# Patient Record
Sex: MALE | Race: OTHER | URBAN - METROPOLITAN AREA
[De-identification: names, ages, dates, MRNs, and addresses within clinical notes are randomized per-mention and may not be internally consistent; named-entity substitution may affect disease eponyms.]

---

## 2024-02-14 ENCOUNTER — EMERGENCY (EMERGENCY)
Facility: HOSPITAL | Age: 44
LOS: 0 days | Discharge: HOME | End: 2024-02-15
Attending: STUDENT IN AN ORGANIZED HEALTH CARE EDUCATION/TRAINING PROGRAM
Payer: COMMERCIAL

## 2024-02-14 VITALS
HEART RATE: 89 BPM | HEIGHT: 70 IN | DIASTOLIC BLOOD PRESSURE: 94 MMHG | SYSTOLIC BLOOD PRESSURE: 164 MMHG | TEMPERATURE: 99 F | RESPIRATION RATE: 18 BRPM | OXYGEN SATURATION: 97 % | WEIGHT: 210.1 LBS

## 2024-02-14 DIAGNOSIS — M54.2 CERVICALGIA: ICD-10-CM

## 2024-02-14 PROCEDURE — 99284 EMERGENCY DEPT VISIT MOD MDM: CPT

## 2024-02-14 PROCEDURE — 99053 MED SERV 10PM-8AM 24 HR FAC: CPT

## 2024-02-14 NOTE — ED ADULT TRIAGE NOTE - LOCATION:
Right arm; Alternatives Discussed Intro (Do Not Add Period): I discussed alternative treatments to Mohs surgery and specifically discussed the risks and benefits of
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
## 2024-02-15 VITALS
DIASTOLIC BLOOD PRESSURE: 99 MMHG | TEMPERATURE: 99 F | RESPIRATION RATE: 18 BRPM | HEART RATE: 86 BPM | OXYGEN SATURATION: 98 % | SYSTOLIC BLOOD PRESSURE: 143 MMHG

## 2024-02-15 RX ORDER — IBUPROFEN 200 MG
600 TABLET ORAL ONCE
Refills: 0 | Status: COMPLETED | OUTPATIENT
Start: 2024-02-15 | End: 2024-02-15

## 2024-02-15 RX ORDER — METHOCARBAMOL 500 MG/1
1 TABLET, FILM COATED ORAL
Qty: 21 | Refills: 0
Start: 2024-02-15 | End: 2024-02-21

## 2024-02-15 RX ORDER — METHOCARBAMOL 500 MG/1
750 TABLET, FILM COATED ORAL ONCE
Refills: 0 | Status: COMPLETED | OUTPATIENT
Start: 2024-02-15 | End: 2024-02-15

## 2024-02-15 RX ORDER — LIDOCAINE 4 G/100G
1 CREAM TOPICAL ONCE
Refills: 0 | Status: COMPLETED | OUTPATIENT
Start: 2024-02-15 | End: 2024-02-15

## 2024-02-15 RX ORDER — IBUPROFEN 200 MG
1 TABLET ORAL
Qty: 21 | Refills: 0
Start: 2024-02-15 | End: 2024-02-21

## 2024-02-15 RX ADMIN — LIDOCAINE 1 PATCH: 4 CREAM TOPICAL at 01:19

## 2024-02-15 RX ADMIN — Medication 600 MILLIGRAM(S): at 01:19

## 2024-02-15 RX ADMIN — METHOCARBAMOL 750 MILLIGRAM(S): 500 TABLET, FILM COATED ORAL at 01:18
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
## 2024-02-15 NOTE — ED PROVIDER NOTE - PHYSICAL EXAMINATION
General: Well appearing female in no acute distress  HEENT: Normocephalic, atraumatic. Moist mucous membranes. Oropharynx clear. No lymphadenopathy.  Eyes: No scleral icterus. EOMI. EZ.  Neck:. Soft and supple. Full ROM without pain. No midline tenderness. +L paraspinal C spine tenderness.   Cardiac: Regular rate and regular rhythm. No murmurs, rubs, gallops. Peripheral pulses 2+ and symmetric. No LE edema.  Resp: Lungs CTAB. Speaking in full sentences. No wheezes, rales or rhonchi.  Abd: Soft, non-tender, non-distended. No guarding or rebound. No scars, masses, or lesions.  Back: Spine midline and non-tender. No CVA tenderness.    Skin: No rashes, abrasions, or lacerations.  Neuro: AO x 3. Moves all extremities symmetrically. Motor strength and sensation grossly intact.
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
## 2024-02-15 NOTE — ED PROVIDER NOTE - PATIENT PORTAL LINK FT
You can access the FollowMyHealth Patient Portal offered by [facility] by registering at the following website: [email or web address]. By joining [employer]’s FollowMyHealth portal, you will also be able to view your health information using other applications (apps) compatible with our system.
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
## 2025-03-11 ENCOUNTER — INPATIENT (INPATIENT)
Facility: HOSPITAL | Age: 45
LOS: 2 days | Discharge: HOME | End: 2025-03-14
Attending: INTERNAL MEDICINE | Admitting: INTERNAL MEDICINE
Payer: COMMERCIAL

## 2025-03-11 VITALS
HEART RATE: 142 BPM | DIASTOLIC BLOOD PRESSURE: 69 MMHG | OXYGEN SATURATION: 95 % | TEMPERATURE: 100 F | RESPIRATION RATE: 18 BRPM | SYSTOLIC BLOOD PRESSURE: 115 MMHG

## 2025-03-11 DIAGNOSIS — N12 TUBULO-INTERSTITIAL NEPHRITIS, NOT SPECIFIED AS ACUTE OR CHRONIC: ICD-10-CM

## 2025-03-11 DIAGNOSIS — K76.9 LIVER DISEASE, UNSPECIFIED: ICD-10-CM

## 2025-03-11 DIAGNOSIS — R03.0 ELEVATED BLOOD-PRESSURE READING, WITHOUT DIAGNOSIS OF HYPERTENSION: ICD-10-CM

## 2025-03-11 DIAGNOSIS — A41.9 SEPSIS, UNSPECIFIED ORGANISM: ICD-10-CM

## 2025-03-11 LAB
ALBUMIN SERPL ELPH-MCNC: 3.9 G/DL — SIGNIFICANT CHANGE UP (ref 3.3–5)
ALP SERPL-CCNC: 107 U/L — SIGNIFICANT CHANGE UP (ref 40–120)
ALT FLD-CCNC: 36 U/L — SIGNIFICANT CHANGE UP (ref 12–78)
ANION GAP SERPL CALC-SCNC: 5 MMOL/L — SIGNIFICANT CHANGE UP (ref 5–17)
APPEARANCE UR: ABNORMAL
APTT BLD: 30.3 SEC — SIGNIFICANT CHANGE UP (ref 24.5–35.6)
AST SERPL-CCNC: 20 U/L — SIGNIFICANT CHANGE UP (ref 15–37)
BACTERIA # UR AUTO: ABNORMAL /HPF
BASOPHILS # BLD AUTO: 0.03 K/UL — SIGNIFICANT CHANGE UP (ref 0–0.2)
BASOPHILS NFR BLD AUTO: 0.2 % — SIGNIFICANT CHANGE UP (ref 0–2)
BILIRUB SERPL-MCNC: 1 MG/DL — SIGNIFICANT CHANGE UP (ref 0.2–1.2)
BILIRUB UR-MCNC: NEGATIVE — SIGNIFICANT CHANGE UP
BUN SERPL-MCNC: 14 MG/DL — SIGNIFICANT CHANGE UP (ref 7–23)
CALCIUM SERPL-MCNC: 8.9 MG/DL — SIGNIFICANT CHANGE UP (ref 8.5–10.1)
CHLORIDE SERPL-SCNC: 102 MMOL/L — SIGNIFICANT CHANGE UP (ref 96–108)
CO2 SERPL-SCNC: 28 MMOL/L — SIGNIFICANT CHANGE UP (ref 22–31)
COD CRY URNS QL: SIGNIFICANT CHANGE UP
COLOR SPEC: YELLOW — SIGNIFICANT CHANGE UP
CREAT SERPL-MCNC: 1.14 MG/DL — SIGNIFICANT CHANGE UP (ref 0.5–1.3)
DIFF PNL FLD: ABNORMAL
EGFR: 80 ML/MIN/1.73M2 — SIGNIFICANT CHANGE UP
EGFR: 80 ML/MIN/1.73M2 — SIGNIFICANT CHANGE UP
EOSINOPHIL # BLD AUTO: 0.01 K/UL — SIGNIFICANT CHANGE UP (ref 0–0.5)
EOSINOPHIL NFR BLD AUTO: 0.1 % — SIGNIFICANT CHANGE UP (ref 0–6)
FLUAV AG NPH QL: SIGNIFICANT CHANGE UP
FLUBV AG NPH QL: SIGNIFICANT CHANGE UP
GLUCOSE SERPL-MCNC: 130 MG/DL — HIGH (ref 70–99)
GLUCOSE UR QL: NEGATIVE MG/DL — SIGNIFICANT CHANGE UP
HCT VFR BLD CALC: 42.6 % — SIGNIFICANT CHANGE UP (ref 39–50)
HGB BLD-MCNC: 15.1 G/DL — SIGNIFICANT CHANGE UP (ref 13–17)
IMM GRANULOCYTES NFR BLD AUTO: 0.6 % — SIGNIFICANT CHANGE UP (ref 0–0.9)
INR BLD: 1.15 RATIO — SIGNIFICANT CHANGE UP (ref 0.85–1.16)
KETONES UR-MCNC: NEGATIVE MG/DL — SIGNIFICANT CHANGE UP
LACTATE SERPL-SCNC: 1.2 MMOL/L — SIGNIFICANT CHANGE UP (ref 0.7–2)
LEUKOCYTE ESTERASE UR-ACNC: ABNORMAL
LYMPHOCYTES # BLD AUTO: 0.98 K/UL — LOW (ref 1–3.3)
LYMPHOCYTES # BLD AUTO: 6.2 % — LOW (ref 13–44)
MCHC RBC-ENTMCNC: 28.8 PG — SIGNIFICANT CHANGE UP (ref 27–34)
MCHC RBC-ENTMCNC: 35.4 G/DL — SIGNIFICANT CHANGE UP (ref 32–36)
MCV RBC AUTO: 81.1 FL — SIGNIFICANT CHANGE UP (ref 80–100)
MONOCYTES # BLD AUTO: 1.19 K/UL — HIGH (ref 0–0.9)
MONOCYTES NFR BLD AUTO: 7.5 % — SIGNIFICANT CHANGE UP (ref 2–14)
NEUTROPHILS # BLD AUTO: 13.46 K/UL — HIGH (ref 1.8–7.4)
NEUTROPHILS NFR BLD AUTO: 85.4 % — HIGH (ref 43–77)
NITRITE UR-MCNC: NEGATIVE — SIGNIFICANT CHANGE UP
NRBC BLD AUTO-RTO: 0 /100 WBCS — SIGNIFICANT CHANGE UP (ref 0–0)
PH UR: 7.5 — SIGNIFICANT CHANGE UP (ref 5–8)
PLATELET # BLD AUTO: 262 K/UL — SIGNIFICANT CHANGE UP (ref 150–400)
POTASSIUM SERPL-MCNC: 3.5 MMOL/L — SIGNIFICANT CHANGE UP (ref 3.5–5.3)
POTASSIUM SERPL-SCNC: 3.5 MMOL/L — SIGNIFICANT CHANGE UP (ref 3.5–5.3)
PROT SERPL-MCNC: 8.3 GM/DL — SIGNIFICANT CHANGE UP (ref 6–8.3)
PROT UR-MCNC: 30 MG/DL
PROTHROM AB SERPL-ACNC: 13.3 SEC — SIGNIFICANT CHANGE UP (ref 9.9–13.4)
RBC # BLD: 5.25 M/UL — SIGNIFICANT CHANGE UP (ref 4.2–5.8)
RBC # FLD: 11.9 % — SIGNIFICANT CHANGE UP (ref 10.3–14.5)
RBC CASTS # UR COMP ASSIST: 2 /HPF — SIGNIFICANT CHANGE UP (ref 0–4)
RSV RNA NPH QL NAA+NON-PROBE: SIGNIFICANT CHANGE UP
SARS-COV-2 RNA SPEC QL NAA+PROBE: SIGNIFICANT CHANGE UP
SODIUM SERPL-SCNC: 135 MMOL/L — SIGNIFICANT CHANGE UP (ref 135–145)
SP GR SPEC: 1.01 — SIGNIFICANT CHANGE UP (ref 1–1.03)
UROBILINOGEN FLD QL: 0.2 MG/DL — SIGNIFICANT CHANGE UP (ref 0.2–1)
WBC # BLD: 15.77 K/UL — HIGH (ref 3.8–10.5)
WBC # FLD AUTO: 15.77 K/UL — HIGH (ref 3.8–10.5)
WBC UR QL: 2 /HPF — SIGNIFICANT CHANGE UP (ref 0–5)

## 2025-03-11 PROCEDURE — 71046 X-RAY EXAM CHEST 2 VIEWS: CPT | Mod: 26

## 2025-03-11 PROCEDURE — 74177 CT ABD & PELVIS W/CONTRAST: CPT | Mod: 26

## 2025-03-11 PROCEDURE — 99222 1ST HOSP IP/OBS MODERATE 55: CPT

## 2025-03-11 PROCEDURE — 93010 ELECTROCARDIOGRAM REPORT: CPT

## 2025-03-11 PROCEDURE — 99285 EMERGENCY DEPT VISIT HI MDM: CPT

## 2025-03-11 RX ORDER — MAGNESIUM, ALUMINUM HYDROXIDE 200-200 MG
30 TABLET,CHEWABLE ORAL EVERY 4 HOURS
Refills: 0 | Status: DISCONTINUED | OUTPATIENT
Start: 2025-03-11 | End: 2025-03-14

## 2025-03-11 RX ORDER — CEFPODOXIME PROXETIL 200 MG/1
1 TABLET, FILM COATED ORAL
Qty: 14 | Refills: 0
Start: 2025-03-11 | End: 2025-03-17

## 2025-03-11 RX ORDER — ACETAMINOPHEN 500 MG/5ML
1000 LIQUID (ML) ORAL ONCE
Refills: 0 | Status: COMPLETED | OUTPATIENT
Start: 2025-03-11 | End: 2025-03-11

## 2025-03-11 RX ORDER — CEFTRIAXONE 500 MG/1
1000 INJECTION, POWDER, FOR SOLUTION INTRAMUSCULAR; INTRAVENOUS EVERY 24 HOURS
Refills: 0 | Status: DISCONTINUED | OUTPATIENT
Start: 2025-03-12 | End: 2025-03-14

## 2025-03-11 RX ORDER — KETOROLAC TROMETHAMINE 30 MG/ML
15 INJECTION, SOLUTION INTRAMUSCULAR; INTRAVENOUS EVERY 6 HOURS
Refills: 0 | Status: DISCONTINUED | OUTPATIENT
Start: 2025-03-11 | End: 2025-03-12

## 2025-03-11 RX ORDER — MELATONIN 5 MG
3 TABLET ORAL AT BEDTIME
Refills: 0 | Status: DISCONTINUED | OUTPATIENT
Start: 2025-03-11 | End: 2025-03-14

## 2025-03-11 RX ORDER — SODIUM CHLORIDE 9 G/1000ML
1000 INJECTION, SOLUTION INTRAVENOUS
Refills: 0 | Status: DISCONTINUED | OUTPATIENT
Start: 2025-03-11 | End: 2025-03-14

## 2025-03-11 RX ORDER — ACETAMINOPHEN 500 MG/5ML
650 LIQUID (ML) ORAL EVERY 6 HOURS
Refills: 0 | Status: DISCONTINUED | OUTPATIENT
Start: 2025-03-11 | End: 2025-03-14

## 2025-03-11 RX ORDER — CEFTRIAXONE 500 MG/1
1000 INJECTION, POWDER, FOR SOLUTION INTRAMUSCULAR; INTRAVENOUS ONCE
Refills: 0 | Status: COMPLETED | OUTPATIENT
Start: 2025-03-11 | End: 2025-03-11

## 2025-03-11 RX ORDER — INFLUENZA A VIRUS A/IDAHO/07/2018 (H1N1) ANTIGEN (MDCK CELL DERIVED, PROPIOLACTONE INACTIVATED, INFLUENZA A VIRUS A/INDIANA/08/2018 (H3N2) ANTIGEN (MDCK CELL DERIVED, PROPIOLACTONE INACTIVATED), INFLUENZA B VIRUS B/SINGAPORE/INFTT-16-0610/2016 ANTIGEN (MDCK CELL DERIVED, PROPIOLACTONE INACTIVATED), INFLUENZA B VIRUS B/IOWA/06/2017 ANTIGEN (MDCK CELL DERIVED, PROPIOLACTONE INACTIVATED) 15; 15; 15; 15 UG/.5ML; UG/.5ML; UG/.5ML; UG/.5ML
0.5 INJECTION, SUSPENSION INTRAMUSCULAR ONCE
Refills: 0 | Status: DISCONTINUED | OUTPATIENT
Start: 2025-03-11 | End: 2025-03-14

## 2025-03-11 RX ORDER — ONDANSETRON HCL/PF 4 MG/2 ML
4 VIAL (ML) INJECTION EVERY 8 HOURS
Refills: 0 | Status: DISCONTINUED | OUTPATIENT
Start: 2025-03-11 | End: 2025-03-14

## 2025-03-11 RX ORDER — SODIUM CHLORIDE 9 G/1000ML
2000 INJECTION, SOLUTION INTRAVENOUS ONCE
Refills: 0 | Status: COMPLETED | OUTPATIENT
Start: 2025-03-11 | End: 2025-03-11

## 2025-03-11 RX ORDER — KETOROLAC TROMETHAMINE 30 MG/ML
15 INJECTION, SOLUTION INTRAMUSCULAR; INTRAVENOUS ONCE
Refills: 0 | Status: DISCONTINUED | OUTPATIENT
Start: 2025-03-11 | End: 2025-03-11

## 2025-03-11 RX ADMIN — Medication 1000 MILLILITER(S): at 15:49

## 2025-03-11 RX ADMIN — KETOROLAC TROMETHAMINE 15 MILLIGRAM(S): 30 INJECTION, SOLUTION INTRAMUSCULAR; INTRAVENOUS at 18:16

## 2025-03-11 RX ADMIN — Medication 400 MILLIGRAM(S): at 13:56

## 2025-03-11 RX ADMIN — SODIUM CHLORIDE 2000 MILLILITER(S): 9 INJECTION, SOLUTION INTRAVENOUS at 13:49

## 2025-03-11 RX ADMIN — Medication 650 MILLIGRAM(S): at 20:19

## 2025-03-11 RX ADMIN — CEFTRIAXONE 100 MILLIGRAM(S): 500 INJECTION, POWDER, FOR SOLUTION INTRAMUSCULAR; INTRAVENOUS at 15:49
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
## 2025-03-11 NOTE — ED PROVIDER NOTE - PHYSICAL EXAMINATION
General: Appears well and nontoxic  Mentation: A&O x 3  psych: mood appropriate  HEENT: airway patent, conjunctivae clear bilaterally  Resp: symmetric chest rise, no resp distress, breath sounds CTA bilaterally  Cardio: tachycardic  GI: soft/nondistended/nontender  : no CVA tenderness  Neuro: sensation and motor function grossly intact  Skin: no cyanosis, no jaundice  MSK: normal movement of all extremities  Lymph/Vasc: no MORTEZA edema

## 2025-03-11 NOTE — H&P ADULT - NSHPLABSRESULTS_GEN_ALL_CORE
15.1   15.77 )-----------( 262      ( 11 Mar 2025 13:42 )             42.6     135  |  102  |  14  ----------------------------<  130[H]       3.5   |  28  |  1.14    Ca    8.9      11 Mar 2025 13:42    TPro  8.3  /  Alb  3.9  /  TBili  1.0  /  DBili  x   /  AST  20  /  ALT  36  /  AlkPhos  107      PT/INR: 13.3/1.15 (25 @ 13:42)  PTT: 30.3 (25 @ 13:42)    Urinalysis Basic - ( 11 Mar 2025 15:10 )  Color: Yellow / Appearance: Cloudy / S.010 / pH: 7.5  Gluc: Negative mg/dL / Ketone: Negative mg/dL  / Bili: Negative / Urobili: 0.2 mg/dL   Blood: Moderate / Protein: 30 mg/dL / Nitrite: Negative   Leuk Esterase: Trace / RBC: 2 /HPF / WBC 2 /HPF   Sq Epi: x / Bacteria: Occasional /HPF  Hyaline Casts: x/WBC Casts: x    Urinalysis with Rflx Culture (collected 11 Mar 2025 15:10)    Chest x-ray 3/11/25  IMPRESSION:  Negative radiographs    CT A/P with IV contrast 3/11/25  FINDINGS:  LOWER CHEST: Within normal limits.    LIVER: 1.4 cm ill-defined hypodense lesion noted in segment 2.  BILE DUCTS: Normal caliber.  GALLBLADDER: Contracted without radiodense calculus.  SPLEEN: Within normal limits.  PANCREAS: Within normal limits.  ADRENALS: Within normal limits.  KIDNEYS/URETERS: No renal stones or hydronephrosis. Mild fullness/wall thickening of the mid/distal right ureter.    BLADDER: Partially distended with mild wall thickening.  REPRODUCTIVE ORGANS: Prostate is enlarged.    BOWEL: No bowel obstruction. Colonic diverticulosis. Appendix normal in caliber containing appendicolith without surrounding inflammatory changes or wall thickening.  PERITONEUM/RETROPERITONEUM: Within normal limits.  VESSELS: Within normal limits.  LYMPH NODES: No lymphadenopathy.  ABDOMINAL WALL: Within normal limits.  BONES: Within normal limits.    IMPRESSION:  Under distended urinary bladder with mild wall thickening, correlate with urinalysis to exclude cystitis.  Mild fullness/wall thickening of the mid/distal right ureter without radiodense calculus, connects to ascending urinary tract infection.  1.4 cm ill-defined hypodense lesion noted in segment 2, incompletely characterized on this examination.  Mildly enlarged prostate.

## 2025-03-11 NOTE — H&P ADULT - ASSESSMENT
Donald Aragon is a 44 year old male with no known PMHx who presented to the ED on 3/11/25 for complaints of dysuria and subjective fevers and admitted for sepsis secondary to pyelonephritis.    Sepsis secondary to pyelonephritis  Reports dysuria, urinary frequency, and malodorous urine x 2 days, associated subjective fevers and chills  Did not take his temperature at home but felt warm to touch and took tylenol for the fever  Temp 100.5, , WBC 15.77K on admission  U/A with proteinuria, moderate blood, trace leuks, negative nitrites, WBC 2, RBC 2, occasional bacteria, COVID/influenza/RSV negative  CXR unremarkable  CT A/P with under distended urinary bladder with mild wall thickening, mild fullness/wall thickening of the mid/distal R. ureter without radiodense calculus, connects to ascending UTI  S/p 2L LR bolus, 1L NS bolus, ceftriaxone 1 g IV, acetaminophen 1 g IV, and ketorolac 15 mg IV in the ED  Ceftriaxone 1 g IV continued  F/u blood cultures, urine culture  F/u gonorrhea/chlamydia, syphilis screen, HIV (STD panel ordered by ER)  Monitor temperature curve and WBC trend    Liver lesion, incidental finding  CT A/P with 1.4 cm ill-defined hypodense lesion noted in segment 2 incompletely characterized on this examination  Will need outpatient f/u    Elevated blood pressure without diagnosis of HTN, suspect secondary to pain  BP as elevated as 154/81 on admission  Anticipate improvement in BP with adequate pain control  Will hold off on initiation of antihypertensives

## 2025-03-11 NOTE — H&P ADULT - NSHPPHYSICALEXAM_GEN_ALL_CORE
T(C): 38.1 (03-11-25 @ 20:10), Max: 38.2 (03-11-25 @ 18:03)  HR: 80 (03-11-25 @ 19:11) (80 - 142)  BP: 112/72 (03-11-25 @ 19:11) (112/72 - 154/81)  RR: 18 (03-11-25 @ 19:11) (18 - 18)  SpO2: 98% (03-11-25 @ 19:11) (95% - 99%)    CONSTITUTIONAL: Well groomed, no apparent distress, pleasant, conversational  EYES: PERRLA and symmetric, EOMI  ENMT: Oral mucosa with moist membranes  RESP: No respiratory distress, no use of accessory muscles; CTA b/l  CV: RRR  GI: Soft, NT, ND, +R. CVA tenderness

## 2025-03-11 NOTE — ED PROVIDER NOTE - CLINICAL SUMMARY MEDICAL DECISION MAKING FREE TEXT BOX
44-year-old male with no past medical history presenting with urinary symptoms. Patient states yesterday he began having pain with urination. Symptoms worsened this morning. Associated fever. Patient states he went to urgent care this morning and was sent to the emergency department due to fever, tachycardia, urinary symptoms. States took Tylenol yesterday. Chest pain, difficulty breathing, nausea, vomiting, abdominal pain, hematuria, penile discharge. Patient states he has 1 sexual partner. Physical exam reveals well-appearing male, tachycardic, clear breath sounds bilaterally, soft nontender abdomen, no CVA tenderness. Patient meets sepsis criteria, most likely due to urinary tract infection. Will do full septic workup. Discussed with patient who is agreeable to STI panel.
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
## 2025-03-11 NOTE — ED ADULT TRIAGE NOTE - CHIEF COMPLAINT QUOTE
Patient sent in from urgent care with a diagnosed UTI. Patient endorses fever, chills and endorses painful unination. A&Ox4, NAD. ambulatory to triage.

## 2025-03-12 LAB
C TRACH RRNA SPEC QL NAA+PROBE: SIGNIFICANT CHANGE UP
HAV IGM SER-ACNC: SIGNIFICANT CHANGE UP
HBV CORE IGM SER-ACNC: SIGNIFICANT CHANGE UP
HBV SURFACE AG SER-ACNC: SIGNIFICANT CHANGE UP
HCT VFR BLD CALC: 39.6 % — SIGNIFICANT CHANGE UP (ref 39–50)
HCV AB S/CO SERPL IA: 0.12 S/CO — SIGNIFICANT CHANGE UP (ref 0–0.79)
HCV AB SERPL-IMP: SIGNIFICANT CHANGE UP
HGB BLD-MCNC: 13.1 G/DL — SIGNIFICANT CHANGE UP (ref 13–17)
HIV 1+2 AB+HIV1 P24 AG SERPL QL IA: SIGNIFICANT CHANGE UP
MCHC RBC-ENTMCNC: 28.1 PG — SIGNIFICANT CHANGE UP (ref 27–34)
MCHC RBC-ENTMCNC: 33.1 G/DL — SIGNIFICANT CHANGE UP (ref 32–36)
MCV RBC AUTO: 84.8 FL — SIGNIFICANT CHANGE UP (ref 80–100)
N GONORRHOEA RRNA SPEC QL NAA+PROBE: SIGNIFICANT CHANGE UP
NRBC BLD AUTO-RTO: 0 /100 WBCS — SIGNIFICANT CHANGE UP (ref 0–0)
PLATELET # BLD AUTO: 207 K/UL — SIGNIFICANT CHANGE UP (ref 150–400)
RBC # BLD: 4.67 M/UL — SIGNIFICANT CHANGE UP (ref 4.2–5.8)
RBC # FLD: 12.4 % — SIGNIFICANT CHANGE UP (ref 10.3–14.5)
SPECIMEN SOURCE: SIGNIFICANT CHANGE UP
T PALLIDUM AB TITR SER: NEGATIVE — SIGNIFICANT CHANGE UP
WBC # BLD: 16.43 K/UL — HIGH (ref 3.8–10.5)
WBC # FLD AUTO: 16.43 K/UL — HIGH (ref 3.8–10.5)

## 2025-03-12 PROCEDURE — 99232 SBSQ HOSP IP/OBS MODERATE 35: CPT

## 2025-03-12 RX ADMIN — KETOROLAC TROMETHAMINE 15 MILLIGRAM(S): 30 INJECTION, SOLUTION INTRAMUSCULAR; INTRAVENOUS at 13:48

## 2025-03-12 RX ADMIN — KETOROLAC TROMETHAMINE 15 MILLIGRAM(S): 30 INJECTION, SOLUTION INTRAMUSCULAR; INTRAVENOUS at 14:47

## 2025-03-12 RX ADMIN — SODIUM CHLORIDE 100 MILLILITER(S): 9 INJECTION, SOLUTION INTRAVENOUS at 02:19

## 2025-03-12 RX ADMIN — CEFTRIAXONE 100 MILLIGRAM(S): 500 INJECTION, POWDER, FOR SOLUTION INTRAMUSCULAR; INTRAVENOUS at 16:15

## 2025-03-12 NOTE — PROGRESS NOTE ADULT - SUBJECTIVE AND OBJECTIVE BOX
Patient: GENEVIEVE LONDONO [id number] 44y Male                            Hospitalist Attending Note    Seen with  [id number]  Wife at bedside.  No complaints.  Fever, chills improved.  Mild dysuria.    Tm 100.5  ____________________PHYSICAL EXAM:  GENERAL:  NAD Alert and Oriented x 3   HEENT: NCAT  CARDIOVASCULAR:  S1, S2  LUNGS: CTAB  ABDOMEN:  soft, (-) tenderness, (-) distension, (+) bowel sounds, (-) guarding, (-) rebound (-) rigidity  EXTREMITIES:  no cyanosis / clubbing / edema.   BACK: No flank pain   ____________________     VITALS:  Vital Signs Last 24 Hrs  T(C): 36.5 (12 Mar 2025 17:00), Max: 38.1 (11 Mar 2025 20:10)  T(F): 97.7 (12 Mar 2025 17:00), Max: 100.5 (11 Mar 2025 20:10)  HR: 89 (12 Mar 2025 17:00) (80 - 116)  BP: 130/90 (12 Mar 2025 17:00) (104/69 - 130/90)  BP(mean): --  RR: 18 (12 Mar 2025 17:00) (17 - 18)  SpO2: 96% (12 Mar 2025 17:00) (95% - 98%)    Parameters below as of 12 Mar 2025 17:00  Patient On (Oxygen Delivery Method): room air     Daily     Daily   CAPILLARY BLOOD GLUCOSE        I&O's Summary    11 Mar 2025 07:01  -  12 Mar 2025 07:00  --------------------------------------------------------  IN: 900 mL / OUT: 0 mL / NET: 900 mL        HISTORY:  PAST MEDICAL & SURGICAL HISTORY:  No pertinent past medical history      Allergies    No Known Allergies    Intolerances       LABS:                        13.1   16.43 )-----------( 207      ( 12 Mar 2025 06:10 )             39.6       Urinalysis with Rflx Culture (collected 11 Mar 2025 15:10)    03-11    135  |  102  |  14  ----------------------------<  130[H]  3.5   |  28  |  1.14    Ca    8.9      11 Mar 2025 13:42    TPro  8.3  /  Alb  3.9  /  TBili  1.0  /  DBili  x   /  AST  20  /  ALT  36  /  AlkPhos  107      PT/INR - ( 11 Mar 2025 13:42 )   PT: 13.3 sec;   INR: 1.15 ratio         PTT - ( 11 Mar 2025 13:42 )  PTT:30.3 sec  LIVER FUNCTIONS - ( 11 Mar 2025 13:42 )  Alb: 3.9 g/dL / Pro: 8.3 gm/dL / ALK PHOS: 107 U/L / ALT: 36 U/L / AST: 20 U/L / GGT: x           Urinalysis Basic - ( 11 Mar 2025 15:10 )    Color: Yellow / Appearance: Cloudy / S.010 / pH: x  Gluc: x / Ketone: Negative mg/dL  / Bili: Negative / Urobili: 0.2 mg/dL   Blood: x / Protein: 30 mg/dL / Nitrite: Negative   Leuk Esterase: Trace / RBC: 2 /HPF / WBC 2 /HPF   Sq Epi: x / Non Sq Epi: x / Bacteria: Occasional /HPF          Urinalysis with Rflx Culture (collected 11 Mar 2025 15:10)          MEDICATIONS:  MEDICATIONS  (STANDING):  cefTRIAXone   IVPB 1000 milliGRAM(s) IV Intermittent every 24 hours  influenza   Vaccine 0.5 milliLiter(s) IntraMuscular once  lactated ringers. 1000 milliLiter(s) (100 mL/Hr) IV Continuous <Continuous>    MEDICATIONS  (PRN):  acetaminophen     Tablet .. 650 milliGRAM(s) Oral every 6 hours PRN Temp greater or equal to 38C (100.4F), Mild Pain (1 - 3)  aluminum hydroxide/magnesium hydroxide/simethicone Suspension 30 milliLiter(s) Oral every 4 hours PRN Dyspepsia  ketorolac   Injectable 15 milliGRAM(s) IV Push every 6 hours PRN Moderate Pain (4 - 6)  melatonin 3 milliGRAM(s) Oral at bedtime PRN Insomnia  ondansetron Injectable 4 milliGRAM(s) IV Push every 8 hours PRN Nausea and/or Vomiting

## 2025-03-13 LAB
-  AMOXICILLIN/CLAVULANIC ACID: SIGNIFICANT CHANGE UP
-  AMPICILLIN/SULBACTAM: SIGNIFICANT CHANGE UP
-  AMPICILLIN: SIGNIFICANT CHANGE UP
-  AZTREONAM: SIGNIFICANT CHANGE UP
-  CEFAZOLIN: SIGNIFICANT CHANGE UP
-  CEFEPIME: SIGNIFICANT CHANGE UP
-  CEFOXITIN: SIGNIFICANT CHANGE UP
-  CEFTRIAXONE: SIGNIFICANT CHANGE UP
-  CEFUROXIME: SIGNIFICANT CHANGE UP
-  CIPROFLOXACIN: SIGNIFICANT CHANGE UP
-  ERTAPENEM: SIGNIFICANT CHANGE UP
-  GENTAMICIN: SIGNIFICANT CHANGE UP
-  IMIPENEM: SIGNIFICANT CHANGE UP
-  LEVOFLOXACIN: SIGNIFICANT CHANGE UP
-  MEROPENEM: SIGNIFICANT CHANGE UP
-  NITROFURANTOIN: SIGNIFICANT CHANGE UP
-  PIPERACILLIN/TAZOBACTAM: SIGNIFICANT CHANGE UP
-  TOBRAMYCIN: SIGNIFICANT CHANGE UP
-  TRIMETHOPRIM/SULFAMETHOXAZOLE: SIGNIFICANT CHANGE UP
ALBUMIN SERPL ELPH-MCNC: 3.2 G/DL — LOW (ref 3.3–5)
ALP SERPL-CCNC: 106 U/L — SIGNIFICANT CHANGE UP (ref 40–120)
ALT FLD-CCNC: 33 U/L — SIGNIFICANT CHANGE UP (ref 12–78)
ANION GAP SERPL CALC-SCNC: 6 MMOL/L — SIGNIFICANT CHANGE UP (ref 5–17)
AST SERPL-CCNC: 12 U/L — LOW (ref 15–37)
BILIRUB DIRECT SERPL-MCNC: 0.1 MG/DL — SIGNIFICANT CHANGE UP (ref 0–0.3)
BILIRUB INDIRECT FLD-MCNC: 0.7 MG/DL — SIGNIFICANT CHANGE UP (ref 0.2–1)
BILIRUB SERPL-MCNC: 0.8 MG/DL — SIGNIFICANT CHANGE UP (ref 0.2–1.2)
BUN SERPL-MCNC: 14 MG/DL — SIGNIFICANT CHANGE UP (ref 7–23)
CALCIUM SERPL-MCNC: 9 MG/DL — SIGNIFICANT CHANGE UP (ref 8.5–10.1)
CHLORIDE SERPL-SCNC: 107 MMOL/L — SIGNIFICANT CHANGE UP (ref 96–108)
CO2 SERPL-SCNC: 28 MMOL/L — SIGNIFICANT CHANGE UP (ref 22–31)
CREAT SERPL-MCNC: 0.92 MG/DL — SIGNIFICANT CHANGE UP (ref 0.5–1.3)
CULTURE RESULTS: ABNORMAL
EGFR: 104 ML/MIN/1.73M2 — SIGNIFICANT CHANGE UP
EGFR: 104 ML/MIN/1.73M2 — SIGNIFICANT CHANGE UP
GLUCOSE SERPL-MCNC: 106 MG/DL — HIGH (ref 70–99)
HCT VFR BLD CALC: 43 % — SIGNIFICANT CHANGE UP (ref 39–50)
HGB BLD-MCNC: 14.2 G/DL — SIGNIFICANT CHANGE UP (ref 13–17)
INR BLD: 1.17 RATIO — HIGH (ref 0.85–1.16)
MCHC RBC-ENTMCNC: 28.2 PG — SIGNIFICANT CHANGE UP (ref 27–34)
MCHC RBC-ENTMCNC: 33 G/DL — SIGNIFICANT CHANGE UP (ref 32–36)
MCV RBC AUTO: 85.5 FL — SIGNIFICANT CHANGE UP (ref 80–100)
MELD SCORE WITH DIALYSIS: 21 POINTS — SIGNIFICANT CHANGE UP
MELD SCORE WITHOUT DIALYSIS: 8 POINTS — SIGNIFICANT CHANGE UP
METHOD TYPE: SIGNIFICANT CHANGE UP
NRBC BLD AUTO-RTO: 0 /100 WBCS — SIGNIFICANT CHANGE UP (ref 0–0)
ORGANISM # SPEC MICROSCOPIC CNT: ABNORMAL
ORGANISM # SPEC MICROSCOPIC CNT: SIGNIFICANT CHANGE UP
PLATELET # BLD AUTO: 239 K/UL — SIGNIFICANT CHANGE UP (ref 150–400)
POTASSIUM SERPL-MCNC: 3.7 MMOL/L — SIGNIFICANT CHANGE UP (ref 3.5–5.3)
POTASSIUM SERPL-SCNC: 3.7 MMOL/L — SIGNIFICANT CHANGE UP (ref 3.5–5.3)
PROT SERPL-MCNC: 7.9 GM/DL — SIGNIFICANT CHANGE UP (ref 6–8.3)
PROTHROM AB SERPL-ACNC: 13.5 SEC — HIGH (ref 9.9–13.4)
RBC # BLD: 5.03 M/UL — SIGNIFICANT CHANGE UP (ref 4.2–5.8)
RBC # FLD: 12.4 % — SIGNIFICANT CHANGE UP (ref 10.3–14.5)
SODIUM SERPL-SCNC: 141 MMOL/L — SIGNIFICANT CHANGE UP (ref 135–145)
SPECIMEN SOURCE: SIGNIFICANT CHANGE UP
WBC # BLD: 10.63 K/UL — HIGH (ref 3.8–10.5)
WBC # FLD AUTO: 10.63 K/UL — HIGH (ref 3.8–10.5)

## 2025-03-13 PROCEDURE — 99232 SBSQ HOSP IP/OBS MODERATE 35: CPT

## 2025-03-13 RX ADMIN — CEFTRIAXONE 100 MILLIGRAM(S): 500 INJECTION, POWDER, FOR SOLUTION INTRAMUSCULAR; INTRAVENOUS at 17:37

## 2025-03-13 NOTE — PROGRESS NOTE ADULT - SUBJECTIVE AND OBJECTIVE BOX
Patient: GENEVIEVE LONDONO [id number] 44y Male                            Hospitalist Attending Note    No new complaints.  No fever / chills.    Mild dysuria.      ____________________PHYSICAL EXAM:  GENERAL:  NAD Alert and Oriented x 3   HEENT: NCAT  CARDIOVASCULAR:  S1, S2  LUNGS: CTAB  ABDOMEN:  soft, (-) tenderness, (-) distension, (+) bowel sounds, (-) guarding, (-) rebound (-) rigidity  EXTREMITIES:  no cyanosis / clubbing / edema.   BACK: No flank pain   ____________________      VITALS:  Vital Signs Last 24 Hrs  T(C): 36.6 (13 Mar 2025 16:45), Max: 36.9 (13 Mar 2025 05:19)  T(F): 97.9 (13 Mar 2025 16:45), Max: 98.5 (13 Mar 2025 05:19)  HR: 81 (13 Mar 2025 16:45) (81 - 98)  BP: 128/87 (13 Mar 2025 16:45) (125/83 - 142/79)  BP(mean): --  RR: 17 (13 Mar 2025 16:45) (17 - 19)  SpO2: 97% (13 Mar 2025 16:45) (96% - 98%)    Parameters below as of 13 Mar 2025 16:45  Patient On (Oxygen Delivery Method): room air     Daily     Daily   CAPILLARY BLOOD GLUCOSE        I&O's Summary    12 Mar 2025 07:01  -  13 Mar 2025 07:00  --------------------------------------------------------  IN: 820 mL / OUT: 0 mL / NET: 820 mL        LABS:                        14.2   10.63 )-----------( 239      ( 13 Mar 2025 07:54 )             43.0     03-13    141  |  107  |  14  ----------------------------<  106[H]  3.7   |  28  |  0.92    Ca    9.0      13 Mar 2025 07:54    TPro  7.9  /  Alb  3.2[L]  /  TBili  0.8  /  DBili  0.1  /  AST  12[L]  /  ALT  33  /  AlkPhos  106  03-13    PT/INR - ( 13 Mar 2025 07:54 )   PT: 13.5 sec;   INR: 1.17 ratio           LIVER FUNCTIONS - ( 13 Mar 2025 07:54 )  Alb: 3.2 g/dL / Pro: 7.9 gm/dL / ALK PHOS: 106 U/L / ALT: 33 U/L / AST: 12 U/L / GGT: x           Urinalysis Basic - ( 13 Mar 2025 07:54 )    Color: x / Appearance: x / SG: x / pH: x  Gluc: 106 mg/dL / Ketone: x  / Bili: x / Urobili: x   Blood: x / Protein: x / Nitrite: x   Leuk Esterase: x / RBC: x / WBC x   Sq Epi: x / Non Sq Epi: x / Bacteria: x            Culture - Urine (collected 11 Mar 2025 15:10)  Source: Clean Catch Clean Catch (Midstream)  Preliminary Report (13 Mar 2025 00:14):    50,000 - 99,000 CFU/mL Escherichia coli    Urinalysis with Rflx Culture (collected 11 Mar 2025 15:10)    Culture - Blood (collected 11 Mar 2025 13:42)  Source: Blood Blood-Peripheral  Preliminary Report (13 Mar 2025 19:01):    No growth at 48 Hours    Culture - Blood (collected 11 Mar 2025 13:42)  Source: Blood Blood-Peripheral  Preliminary Report (13 Mar 2025 19:01):    No growth at 48 Hours        MEDICATIONS:  acetaminophen     Tablet .. 650 milliGRAM(s) Oral every 6 hours PRN  aluminum hydroxide/magnesium hydroxide/simethicone Suspension 30 milliLiter(s) Oral every 4 hours PRN  cefTRIAXone   IVPB 1000 milliGRAM(s) IV Intermittent every 24 hours  influenza   Vaccine 0.5 milliLiter(s) IntraMuscular once  lactated ringers. 1000 milliLiter(s) IV Continuous <Continuous>  melatonin 3 milliGRAM(s) Oral at bedtime PRN  ondansetron Injectable 4 milliGRAM(s) IV Push every 8 hours PRN

## 2025-03-14 VITALS
OXYGEN SATURATION: 99 % | SYSTOLIC BLOOD PRESSURE: 129 MMHG | TEMPERATURE: 98 F | RESPIRATION RATE: 17 BRPM | HEART RATE: 87 BPM | DIASTOLIC BLOOD PRESSURE: 91 MMHG

## 2025-03-14 LAB
ANION GAP SERPL CALC-SCNC: 4 MMOL/L — LOW (ref 5–17)
BUN SERPL-MCNC: 20 MG/DL — SIGNIFICANT CHANGE UP (ref 7–23)
CALCIUM SERPL-MCNC: 9.1 MG/DL — SIGNIFICANT CHANGE UP (ref 8.5–10.1)
CHLORIDE SERPL-SCNC: 109 MMOL/L — HIGH (ref 96–108)
CO2 SERPL-SCNC: 27 MMOL/L — SIGNIFICANT CHANGE UP (ref 22–31)
CREAT SERPL-MCNC: 1 MG/DL — SIGNIFICANT CHANGE UP (ref 0.5–1.3)
EGFR: 94 ML/MIN/1.73M2 — SIGNIFICANT CHANGE UP
EGFR: 94 ML/MIN/1.73M2 — SIGNIFICANT CHANGE UP
GLUCOSE SERPL-MCNC: 111 MG/DL — HIGH (ref 70–99)
HCT VFR BLD CALC: 42.4 % — SIGNIFICANT CHANGE UP (ref 39–50)
HCV AB S/CO SERPL IA: 0.09 S/CO — SIGNIFICANT CHANGE UP (ref 0–0.79)
HCV AB SERPL-IMP: SIGNIFICANT CHANGE UP
HGB BLD-MCNC: 14.1 G/DL — SIGNIFICANT CHANGE UP (ref 13–17)
MCHC RBC-ENTMCNC: 28 PG — SIGNIFICANT CHANGE UP (ref 27–34)
MCHC RBC-ENTMCNC: 33.3 G/DL — SIGNIFICANT CHANGE UP (ref 32–36)
MCV RBC AUTO: 84.3 FL — SIGNIFICANT CHANGE UP (ref 80–100)
NRBC BLD AUTO-RTO: 0 /100 WBCS — SIGNIFICANT CHANGE UP (ref 0–0)
PLATELET # BLD AUTO: 285 K/UL — SIGNIFICANT CHANGE UP (ref 150–400)
POTASSIUM SERPL-MCNC: 3.6 MMOL/L — SIGNIFICANT CHANGE UP (ref 3.5–5.3)
POTASSIUM SERPL-SCNC: 3.6 MMOL/L — SIGNIFICANT CHANGE UP (ref 3.5–5.3)
RBC # BLD: 5.03 M/UL — SIGNIFICANT CHANGE UP (ref 4.2–5.8)
RBC # FLD: 12.4 % — SIGNIFICANT CHANGE UP (ref 10.3–14.5)
SODIUM SERPL-SCNC: 140 MMOL/L — SIGNIFICANT CHANGE UP (ref 135–145)
WBC # BLD: 5.72 K/UL — SIGNIFICANT CHANGE UP (ref 3.8–10.5)
WBC # FLD AUTO: 5.72 K/UL — SIGNIFICANT CHANGE UP (ref 3.8–10.5)

## 2025-03-14 PROCEDURE — 99239 HOSP IP/OBS DSCHRG MGMT >30: CPT

## 2025-03-14 RX ORDER — SULFAMETHOXAZOLE/TRIMETHOPRIM 800-160 MG
1 TABLET ORAL
Qty: 17 | Refills: 0
Start: 2025-03-14

## 2025-03-14 NOTE — DISCHARGE NOTE PROVIDER - DISCHARGE SERVICE FOR PATIENT
Yes on the discharge service for the patient. I have reviewed and made amendments to the documentation where necessary.

## 2025-03-14 NOTE — DISCHARGE NOTE PROVIDER - NSDCFUADDINST_GEN_ALL_CORE_FT
It is important to see your primary physician as well as any specialty physicians within the next week to perform a comprehensive medical review.  Call their offices for an appointment as soon as you leave the hospital.  You will also need to see them for renewal of your medications.  If have any difficulty following with a physician, contact the [facility] [phone] or via [email or web address].   To obtain your results, you can access the [employer] [facility] Patient Portal at [email or web address].  Your medical issues appear to be stable at this time, but if your symptoms recur or worsen, contact your physicians and/or return to the hospital if necessary.  If you encounter any issues or questions with your medication, call your physicians before stopping the medication.

## 2025-03-14 NOTE — DISCHARGE NOTE NURSING/CASE MANAGEMENT/SOCIAL WORK - FINANCIAL ASSISTANCE
[facility] provides services at a reduced cost to those who are determined to be eligible through [facility]’s financial assistance program. Information regarding [facility]’s financial assistance program can be found by going to [email or web address] or by calling [phone].

## 2025-03-14 NOTE — PROGRESS NOTE ADULT - SUBJECTIVE AND OBJECTIVE BOX
Patient: GENEVIEVE LONDONO [id number] 44y Male                            Hospitalist Attending Note    No new complaints.  No fever / chills / dysuria.  Feels well      ____________________PHYSICAL EXAM:  GENERAL:  NAD Alert and Oriented x 3   HEENT: NCAT  CARDIOVASCULAR:  S1, S2  LUNGS: CTAB  ABDOMEN:  soft, (-) tenderness, (-) distension, (+) bowel sounds, (-) guarding, (-) rebound (-) rigidity  EXTREMITIES:  no cyanosis / clubbing / edema.   BACK: No flank pain   ____________________    VITALS:  Vital Signs Last 24 Hrs  T(C): 36.4 (14 Mar 2025 10:50), Max: 37.1 (13 Mar 2025 23:49)  T(F): 97.5 (14 Mar 2025 10:50), Max: 98.7 (13 Mar 2025 23:49)  HR: 87 (14 Mar 2025 10:50) (80 - 88)  BP: 129/91 (14 Mar 2025 10:50) (111/72 - 129/91)  BP(mean): --  RR: 17 (14 Mar 2025 10:50) (16 - 18)  SpO2: 99% (14 Mar 2025 10:50) (97% - 99%)    Parameters below as of 14 Mar 2025 10:50  Patient On (Oxygen Delivery Method): room air     Daily     Daily   CAPILLARY BLOOD GLUCOSE        I&O's Summary      LABS:                        14.1   5.72  )-----------( 285      ( 14 Mar 2025 07:45 )             42.4     03-14    140  |  109[H]  |  20  ----------------------------<  111[H]  3.6   |  27  |  1.00    Ca    9.1      14 Mar 2025 07:45    TPro  7.9  /  Alb  3.2[L]  /  TBili  0.8  /  DBili  0.1  /  AST  12[L]  /  ALT  33  /  AlkPhos  106  03-13    PT/INR - ( 13 Mar 2025 07:54 )   PT: 13.5 sec;   INR: 1.17 ratio           LIVER FUNCTIONS - ( 13 Mar 2025 07:54 )  Alb: 3.2 g/dL / Pro: 7.9 gm/dL / ALK PHOS: 106 U/L / ALT: 33 U/L / AST: 12 U/L / GGT: x           Urinalysis Basic - ( 14 Mar 2025 07:45 )    Color: x / Appearance: x / SG: x / pH: x  Gluc: 111 mg/dL / Ketone: x  / Bili: x / Urobili: x   Blood: x / Protein: x / Nitrite: x   Leuk Esterase: x / RBC: x / WBC x   Sq Epi: x / Non Sq Epi: x / Bacteria: x            Culture - Urine (collected 11 Mar 2025 15:10)  Source: Clean Catch Clean Catch (Midstream)  Final Report (13 Mar 2025 21:32):    50,000 - 99,000 CFU/mL Escherichia coli  Organism: Escherichia coli (13 Mar 2025 21:32)  Organism: Escherichia coli (13 Mar 2025 21:32)    Urinalysis with Rflx Culture (collected 11 Mar 2025 15:10)    Culture - Blood (collected 11 Mar 2025 13:42)  Source: Blood Blood-Peripheral  Preliminary Report (13 Mar 2025 19:01):    No growth at 48 Hours    Culture - Blood (collected 11 Mar 2025 13:42)  Source: Blood Blood-Peripheral  Preliminary Report (13 Mar 2025 19:01):    No growth at 48 Hours        MEDICATIONS:  acetaminophen     Tablet .. 650 milliGRAM(s) Oral every 6 hours PRN  aluminum hydroxide/magnesium hydroxide/simethicone Suspension 30 milliLiter(s) Oral every 4 hours PRN  cefTRIAXone   IVPB 1000 milliGRAM(s) IV Intermittent every 24 hours  influenza   Vaccine 0.5 milliLiter(s) IntraMuscular once  lactated ringers. 1000 milliLiter(s) IV Continuous <Continuous>  melatonin 3 milliGRAM(s) Oral at bedtime PRN  ondansetron Injectable 4 milliGRAM(s) IV Push every 8 hours PRN                               Patient: GENEVIEVE LONDONO [id number] 44y Male                            Hospitalist Attending Note    No new complaints.  No fever / chills / dysuria.  Feels well  Seen with  [id number]    ____________________PHYSICAL EXAM:  GENERAL:  NAD Alert and Oriented x 3   HEENT: NCAT  CARDIOVASCULAR:  S1, S2  LUNGS: CTAB  ABDOMEN:  soft, (-) tenderness, (-) distension, (+) bowel sounds, (-) guarding, (-) rebound (-) rigidity  EXTREMITIES:  no cyanosis / clubbing / edema.   BACK: No flank pain   ____________________    VITALS:  Vital Signs Last 24 Hrs  T(C): 36.4 (14 Mar 2025 10:50), Max: 37.1 (13 Mar 2025 23:49)  T(F): 97.5 (14 Mar 2025 10:50), Max: 98.7 (13 Mar 2025 23:49)  HR: 87 (14 Mar 2025 10:50) (80 - 88)  BP: 129/91 (14 Mar 2025 10:50) (111/72 - 129/91)  BP(mean): --  RR: 17 (14 Mar 2025 10:50) (16 - 18)  SpO2: 99% (14 Mar 2025 10:50) (97% - 99%)    Parameters below as of 14 Mar 2025 10:50  Patient On (Oxygen Delivery Method): room air     Daily     Daily   CAPILLARY BLOOD GLUCOSE        I&O's Summary      LABS:                        14.1   5.72  )-----------( 285      ( 14 Mar 2025 07:45 )             42.4     03-14    140  |  109[H]  |  20  ----------------------------<  111[H]  3.6   |  27  |  1.00    Ca    9.1      14 Mar 2025 07:45    TPro  7.9  /  Alb  3.2[L]  /  TBili  0.8  /  DBili  0.1  /  AST  12[L]  /  ALT  33  /  AlkPhos  106  03-13    PT/INR - ( 13 Mar 2025 07:54 )   PT: 13.5 sec;   INR: 1.17 ratio           LIVER FUNCTIONS - ( 13 Mar 2025 07:54 )  Alb: 3.2 g/dL / Pro: 7.9 gm/dL / ALK PHOS: 106 U/L / ALT: 33 U/L / AST: 12 U/L / GGT: x           Urinalysis Basic - ( 14 Mar 2025 07:45 )    Color: x / Appearance: x / SG: x / pH: x  Gluc: 111 mg/dL / Ketone: x  / Bili: x / Urobili: x   Blood: x / Protein: x / Nitrite: x   Leuk Esterase: x / RBC: x / WBC x   Sq Epi: x / Non Sq Epi: x / Bacteria: x            Culture - Urine (collected 11 Mar 2025 15:10)  Source: Clean Catch Clean Catch (Midstream)  Final Report (13 Mar 2025 21:32):    50,000 - 99,000 CFU/mL Escherichia coli  Organism: Escherichia coli (13 Mar 2025 21:32)  Organism: Escherichia coli (13 Mar 2025 21:32)    Urinalysis with Rflx Culture (collected 11 Mar 2025 15:10)    Culture - Blood (collected 11 Mar 2025 13:42)  Source: Blood Blood-Peripheral  Preliminary Report (13 Mar 2025 19:01):    No growth at 48 Hours    Culture - Blood (collected 11 Mar 2025 13:42)  Source: Blood Blood-Peripheral  Preliminary Report (13 Mar 2025 19:01):    No growth at 48 Hours        MEDICATIONS:  acetaminophen     Tablet .. 650 milliGRAM(s) Oral every 6 hours PRN  aluminum hydroxide/magnesium hydroxide/simethicone Suspension 30 milliLiter(s) Oral every 4 hours PRN  cefTRIAXone   IVPB 1000 milliGRAM(s) IV Intermittent every 24 hours  influenza   Vaccine 0.5 milliLiter(s) IntraMuscular once  lactated ringers. 1000 milliLiter(s) IV Continuous <Continuous>  melatonin 3 milliGRAM(s) Oral at bedtime PRN  ondansetron Injectable 4 milliGRAM(s) IV Push every 8 hours PRN

## 2025-03-14 NOTE — DISCHARGE NOTE NURSING/CASE MANAGEMENT/SOCIAL WORK - NSDCFUADDAPPT_GEN_ALL_CORE_FT
APPTS ARE READY TO BE MADE: [X] YES    Best Family or Patient Contact (if needed):    Additional Information about above appointments (if needed):    During your hospitalization, you had abnormal findings on radiologic / laboratory studies.  Please see your primary doctor for followup of these findings to ensure that they have resolved.  You may require further evaluation to exclude certain serious conditions, and / or treatment.     < from: CT Abdomen and Pelvis w/ IV Cont (03.11.25 @ 19:26) >    Under distended urinary bladder with mild wall thickening, correlate with   urinalysis to exclude cystitis.  Mild fullness/wall thickening of the mid/distal right ureter without   radiodense calculus, connects to ascending urinary tract infection.  1.4 cm ill-defined hypodense lesion noted in segment 2, incompletely   characterized on this examination.  Mildly enlarged prostate.        < end of copied text >

## 2025-03-14 NOTE — CHART NOTE - NSCHARTNOTEFT_GEN_A_CORE
[facility]  [address], [address]  [phone]      Name: GENEVIEVE LONDONO 44y (1980)  Date: March 11, 2025 - March 14, 2025        This is to certify that the above named patient was evaluated and treated in the [facility] on the above dates.    Recommendations:    [  ] May return to work/ all activities as tolerated without limitations    [ x ] Recommend rest, may return to work/ all activities as tolerated without limitations on March 17, 2025    [  ] Further evaluation and/ or follow-up necessary.            Sincerely,        I'LAINEY [address] Stephen  March 14, 2025

## 2025-03-14 NOTE — DISCHARGE NOTE PROVIDER - ATTENDING ATTESTATION STATEMENT
I have personally seen and examined the patient. I have collaborated with and supervised the Name band;

## 2025-03-14 NOTE — PROGRESS NOTE ADULT - ASSESSMENT
44 year old male with no known PMHx who presented to the ED on 3/11/25 for complaints of dysuria and subjective fevers and admitted for sepsis secondary to pyelonephritis.    # Sepsis - improved.  s/p IVF.  Lacate 1.2  # Pyelonephritis - f/u culture - BCx negative.  UCx growing Ecoli, sensitivities pending.  Continue Rocephin.  WBC nearly normalized.    # Incidental Liver Lesion - 1.4cm liver lesion noted.  Will need f/u as outpatient.   # DVT Prophylaxis - OOB  
44 year old male with no known PMHx who presented to the ED on 3/11/25 for complaints of dysuria and subjective fevers and admitted for sepsis secondary to pyelonephritis.    # Sepsis - improved.  s/p IVF.  Lacate 1.2  # Pyelonephritis - f/u cultures.  Continue Rocephin.  WBC remains elevated 16, febrile.  # Incidental Liver Lesion - 1.4cm liver lesion noted.  Will need f/u as outpatient.   # DVT Prophylaxis - OOB  
44 year old male with no known PMHx who presented to the ED on 3/11/25 for complaints of dysuria and subjective fevers and admitted for sepsis secondary to pyelonephritis.    # Sepsis due to Ecoli - improved.  s/p IVF.  Lacate 1.2  # Pyelonephritis - Cx growing pansensitive Ecoli.  BCx negative.  Change to Bactrim.  WBC normalized.    # Incidental Liver Lesion - 1.4cm liver lesion noted.  D/w patient need for outpatient f/u to exclude possibilities including malignancy.  He was able to express a clear understanding via .    # DVT Prophylaxis - OOB

## 2025-03-14 NOTE — DISCHARGE NOTE PROVIDER - NSDCFUADDAPPT_GEN_ALL_CORE_FT
APPTS ARE READY TO BE MADE: [X] YES    Best Family or Patient Contact (if needed):    Additional Information about above appointments (if needed):    During your hospitalization, you had abnormal findings on radiologic / laboratory studies.  Please see your primary doctor for followup of these findings to ensure that they have resolved.  You may require further evaluation to exclude certain serious conditions, and / or treatment.     < from: CT Abdomen and Pelvis w/ IV Cont (03.11.25 @ 19:26) >    Under distended urinary bladder with mild wall thickening, correlate with   urinalysis to exclude cystitis.  Mild fullness/wall thickening of the mid/distal right ureter without   radiodense calculus, connects to ascending urinary tract infection.  1.4 cm ill-defined hypodense lesion noted in segment 2, incompletely   characterized on this examination.  Mildly enlarged prostate.        < end of copied text >     APPTS ARE READY TO BE MADE: [X] YES    Best Family or Patient Contact (if needed):    Additional Information about above appointments (if needed):    During your hospitalization, you had abnormal findings on radiologic / laboratory studies.  Please see your primary doctor for followup of these findings to ensure that they have resolved.  You may require further evaluation to exclude certain serious conditions, and / or treatment.     < from: CT Abdomen and Pelvis w/ IV Cont (03.11.25 @ 19:26) >    Under distended urinary bladder with mild wall thickening, correlate with   urinalysis to exclude cystitis.  Mild fullness/wall thickening of the mid/distal right ureter without   radiodense calculus, connects to ascending urinary tract infection.  1.4 cm ill-defined hypodense lesion noted in segment 2, incompletely   characterized on this examination.  Mildly enlarged prostate.        < end of copied text >      Internal Medicine:  Patient informed us they already have secured a follow up appointment which was not scheduled by our team. Pt was seen on by Dr. Mansi Mcmillan MD.

## 2025-03-14 NOTE — DISCHARGE NOTE PROVIDER - NSDCCPCAREPLAN_GEN_ALL_CORE_FT
PRINCIPAL DISCHARGE DIAGNOSIS  Diagnosis: Sepsis due to Escherichia coli  Assessment and Plan of Treatment:       SECONDARY DISCHARGE DIAGNOSES  Diagnosis: Pyelonephritis  Assessment and Plan of Treatment:     Diagnosis: Liver lesion  Assessment and Plan of Treatment:

## 2025-03-14 NOTE — DISCHARGE NOTE PROVIDER - HOSPITAL COURSE
Reason for Admission: Dysuria, subjective fevers, and suspected sepsis secondary to pyelonephritis.    Hospital Course: The patient presented to the ED with complaints of dysuria and subjective fevers. He was admitted for sepsis secondary to pyelonephritis. He was treated with IV fluids and antibiotics. His lactate improved to 1.2. Blood cultures were negative. Urine culture grew E. coli sensitive to Bactrim. His WBC normalized. An incidental 1.4 cm liver lesion was noted on imaging. DVT prophylaxis with ambulation was implemented.    Discharge Diagnoses:    Resolved Sepsis secondary to Pyelonephritis: The patient's sepsis has clinically improved with IV fluids and antibiotic therapy. His lactate has normalized and his WBC count has returned to normal limits. Urine culture confirms E. coli pyelonephritis, sensitive to Bactrim.  Incidental Liver Lesion: A 1.4cm liver lesion was noted. Further outpatient workup is required.    Discharge Instructions:    The patient should follow up with his primary care physician (PMD) within [Number] days for further management of his pyelonephritis and to discuss further evaluation of the liver lesion including, but not limited to, consideration of malignancy.  The patient was instructed to monitor for signs and symptoms of recurrent UTI including fever, chills, flank pain, and dysuria.  Continue increased fluid intake.  Follow-up appointments:    Primary Care Physician:  Condition at Discharge: Stable and improved.

## 2025-03-14 NOTE — DISCHARGE NOTE NURSING/CASE MANAGEMENT/SOCIAL WORK - PATIENT PORTAL LINK FT
You can access the FollowMyHealth Patient Portal offered by [facility] by registering at the following website: [email or web address]. By joining [facility]’s FollowMyHealth portal, you will also be able to view your health information using other applications (apps) compatible with our system.

## 2025-03-16 LAB
CULTURE RESULTS: SIGNIFICANT CHANGE UP
CULTURE RESULTS: SIGNIFICANT CHANGE UP
SPECIMEN SOURCE: SIGNIFICANT CHANGE UP
SPECIMEN SOURCE: SIGNIFICANT CHANGE UP

## 2025-03-18 DIAGNOSIS — Z11.52 ENCOUNTER FOR SCREENING FOR COVID-19: ICD-10-CM

## 2025-03-18 DIAGNOSIS — N12 TUBULO-INTERSTITIAL NEPHRITIS, NOT SPECIFIED AS ACUTE OR CHRONIC: ICD-10-CM

## 2025-03-18 DIAGNOSIS — A41.51 SEPSIS DUE TO ESCHERICHIA COLI [E. COLI]: ICD-10-CM

## 2025-03-18 DIAGNOSIS — A41.9 SEPSIS, UNSPECIFIED ORGANISM: ICD-10-CM

## 2025-05-31 ENCOUNTER — EMERGENCY (EMERGENCY)
Facility: HOSPITAL | Age: 45
LOS: 0 days | Discharge: HOME | End: 2025-05-31
Attending: STUDENT IN AN ORGANIZED HEALTH CARE EDUCATION/TRAINING PROGRAM
Payer: COMMERCIAL

## 2025-05-31 VITALS
HEART RATE: 71 BPM | OXYGEN SATURATION: 98 % | SYSTOLIC BLOOD PRESSURE: 139 MMHG | DIASTOLIC BLOOD PRESSURE: 99 MMHG | TEMPERATURE: 98 F | RESPIRATION RATE: 14 BRPM

## 2025-05-31 VITALS
TEMPERATURE: 98 F | SYSTOLIC BLOOD PRESSURE: 158 MMHG | DIASTOLIC BLOOD PRESSURE: 98 MMHG | HEIGHT: 72 IN | OXYGEN SATURATION: 98 % | HEART RATE: 86 BPM | RESPIRATION RATE: 16 BRPM | WEIGHT: 210.1 LBS

## 2025-05-31 DIAGNOSIS — R07.9 CHEST PAIN, UNSPECIFIED: ICD-10-CM

## 2025-05-31 DIAGNOSIS — I10 ESSENTIAL (PRIMARY) HYPERTENSION: ICD-10-CM

## 2025-05-31 PROBLEM — Z78.9 OTHER SPECIFIED HEALTH STATUS: Chronic | Status: ACTIVE | Noted: 2025-03-11

## 2025-05-31 LAB
ALBUMIN SERPL ELPH-MCNC: 3.7 G/DL — SIGNIFICANT CHANGE UP (ref 3.3–5)
ALP SERPL-CCNC: 94 U/L — SIGNIFICANT CHANGE UP (ref 40–120)
ALT FLD-CCNC: 36 U/L — SIGNIFICANT CHANGE UP (ref 12–78)
ANION GAP SERPL CALC-SCNC: 7 MMOL/L — SIGNIFICANT CHANGE UP (ref 5–17)
APTT BLD: 31.9 SEC — SIGNIFICANT CHANGE UP (ref 26.1–36.8)
AST SERPL-CCNC: 17 U/L — SIGNIFICANT CHANGE UP (ref 15–37)
BASOPHILS # BLD AUTO: 0.04 K/UL — SIGNIFICANT CHANGE UP (ref 0–0.2)
BASOPHILS NFR BLD AUTO: 0.6 % — SIGNIFICANT CHANGE UP (ref 0–2)
BILIRUB SERPL-MCNC: 0.5 MG/DL — SIGNIFICANT CHANGE UP (ref 0.2–1.2)
BUN SERPL-MCNC: 11 MG/DL — SIGNIFICANT CHANGE UP (ref 7–23)
CALCIUM SERPL-MCNC: 8.7 MG/DL — SIGNIFICANT CHANGE UP (ref 8.5–10.1)
CHLORIDE SERPL-SCNC: 109 MMOL/L — HIGH (ref 96–108)
CO2 SERPL-SCNC: 25 MMOL/L — SIGNIFICANT CHANGE UP (ref 22–31)
CREAT SERPL-MCNC: 0.94 MG/DL — SIGNIFICANT CHANGE UP (ref 0.5–1.3)
EGFR: 101 ML/MIN/1.73M2 — SIGNIFICANT CHANGE UP
EGFR: 101 ML/MIN/1.73M2 — SIGNIFICANT CHANGE UP
EOSINOPHIL # BLD AUTO: 0.12 K/UL — SIGNIFICANT CHANGE UP (ref 0–0.5)
EOSINOPHIL NFR BLD AUTO: 1.7 % — SIGNIFICANT CHANGE UP (ref 0–6)
FLUAV AG NPH QL: SIGNIFICANT CHANGE UP
FLUBV AG NPH QL: SIGNIFICANT CHANGE UP
GLUCOSE SERPL-MCNC: 101 MG/DL — HIGH (ref 70–99)
HCT VFR BLD CALC: 45 % — SIGNIFICANT CHANGE UP (ref 39–50)
HGB BLD-MCNC: 14.7 G/DL — SIGNIFICANT CHANGE UP (ref 13–17)
IMM GRANULOCYTES NFR BLD AUTO: 0.1 % — SIGNIFICANT CHANGE UP (ref 0–0.9)
INR BLD: 0.98 RATIO — SIGNIFICANT CHANGE UP (ref 0.85–1.16)
LIDOCAIN IGE QN: 29 U/L — SIGNIFICANT CHANGE UP (ref 13–75)
LYMPHOCYTES # BLD AUTO: 2.21 K/UL — SIGNIFICANT CHANGE UP (ref 1–3.3)
LYMPHOCYTES # BLD AUTO: 31.3 % — SIGNIFICANT CHANGE UP (ref 13–44)
MAGNESIUM SERPL-MCNC: 2.1 MG/DL — SIGNIFICANT CHANGE UP (ref 1.6–2.6)
MCHC RBC-ENTMCNC: 27.8 PG — SIGNIFICANT CHANGE UP (ref 27–34)
MCHC RBC-ENTMCNC: 32.7 G/DL — SIGNIFICANT CHANGE UP (ref 32–36)
MCV RBC AUTO: 85.1 FL — SIGNIFICANT CHANGE UP (ref 80–100)
MONOCYTES # BLD AUTO: 0.49 K/UL — SIGNIFICANT CHANGE UP (ref 0–0.9)
MONOCYTES NFR BLD AUTO: 6.9 % — SIGNIFICANT CHANGE UP (ref 2–14)
NEUTROPHILS # BLD AUTO: 4.2 K/UL — SIGNIFICANT CHANGE UP (ref 1.8–7.4)
NEUTROPHILS NFR BLD AUTO: 59.4 % — SIGNIFICANT CHANGE UP (ref 43–77)
NRBC BLD AUTO-RTO: 0 /100 WBCS — SIGNIFICANT CHANGE UP (ref 0–0)
NT-PROBNP SERPL-SCNC: 64 PG/ML — SIGNIFICANT CHANGE UP (ref 0–125)
PLATELET # BLD AUTO: 267 K/UL — SIGNIFICANT CHANGE UP (ref 150–400)
POTASSIUM SERPL-MCNC: 3.8 MMOL/L — SIGNIFICANT CHANGE UP (ref 3.5–5.3)
POTASSIUM SERPL-SCNC: 3.8 MMOL/L — SIGNIFICANT CHANGE UP (ref 3.5–5.3)
PROT SERPL-MCNC: 7.8 GM/DL — SIGNIFICANT CHANGE UP (ref 6–8.3)
PROTHROM AB SERPL-ACNC: 11.4 SEC — SIGNIFICANT CHANGE UP (ref 9.9–13.4)
RBC # BLD: 5.29 M/UL — SIGNIFICANT CHANGE UP (ref 4.2–5.8)
RBC # FLD: 12.2 % — SIGNIFICANT CHANGE UP (ref 10.3–14.5)
RSV RNA NPH QL NAA+NON-PROBE: SIGNIFICANT CHANGE UP
SARS-COV-2 RNA SPEC QL NAA+PROBE: SIGNIFICANT CHANGE UP
SODIUM SERPL-SCNC: 141 MMOL/L — SIGNIFICANT CHANGE UP (ref 135–145)
SOURCE RESPIRATORY: SIGNIFICANT CHANGE UP
TROPONIN I, HIGH SENSITIVITY RESULT: 6.3 NG/L — SIGNIFICANT CHANGE UP
TROPONIN I, HIGH SENSITIVITY RESULT: 8 NG/L — SIGNIFICANT CHANGE UP
WBC # BLD: 7.07 K/UL — SIGNIFICANT CHANGE UP (ref 3.8–10.5)
WBC # FLD AUTO: 7.07 K/UL — SIGNIFICANT CHANGE UP (ref 3.8–10.5)

## 2025-05-31 PROCEDURE — 99284 EMERGENCY DEPT VISIT MOD MDM: CPT

## 2025-05-31 PROCEDURE — 93010 ELECTROCARDIOGRAM REPORT: CPT

## 2025-05-31 PROCEDURE — 71045 X-RAY EXAM CHEST 1 VIEW: CPT | Mod: 26

## 2025-05-31 RX ORDER — ACETAMINOPHEN 500 MG/5ML
1000 LIQUID (ML) ORAL ONCE
Refills: 0 | Status: COMPLETED | OUTPATIENT
Start: 2025-05-31 | End: 2025-05-31

## 2025-05-31 RX ADMIN — Medication 400 MILLIGRAM(S): at 15:19

## 2025-05-31 RX ADMIN — Medication 1000 MILLIGRAM(S): at 16:05

## 2025-05-31 RX ADMIN — Medication 1000 MILLIGRAM(S): at 16:01

## 2025-06-18 ENCOUNTER — NON-APPOINTMENT (OUTPATIENT)
Age: 45
End: 2025-06-18

## 2025-06-18 ENCOUNTER — APPOINTMENT (OUTPATIENT)
Dept: CARDIOLOGY | Facility: CLINIC | Age: 45
End: 2025-06-18
Payer: COMMERCIAL

## 2025-06-18 VITALS
HEART RATE: 88 BPM | RESPIRATION RATE: 17 BRPM | SYSTOLIC BLOOD PRESSURE: 124 MMHG | OXYGEN SATURATION: 98 % | HEIGHT: 67 IN | BODY MASS INDEX: 31.08 KG/M2 | DIASTOLIC BLOOD PRESSURE: 90 MMHG | WEIGHT: 198 LBS

## 2025-06-18 PROBLEM — R00.2 PALPITATIONS: Status: ACTIVE | Noted: 2025-06-18

## 2025-06-18 PROBLEM — R00.0 TACHYCARDIA: Status: ACTIVE | Noted: 2025-06-18

## 2025-06-18 PROBLEM — F41.9 ANXIETY: Status: ACTIVE | Noted: 2025-06-18

## 2025-06-18 PROBLEM — R07.9 CHEST PAIN: Status: ACTIVE | Noted: 2025-06-18

## 2025-06-18 PROBLEM — R63.4 WEIGHT LOSS: Status: ACTIVE | Noted: 2025-06-18

## 2025-06-18 PROBLEM — Z00.00 ENCOUNTER FOR PREVENTIVE HEALTH EXAMINATION: Status: ACTIVE | Noted: 2025-06-18

## 2025-06-18 PROCEDURE — 99204 OFFICE O/P NEW MOD 45 MIN: CPT | Mod: 25

## 2025-06-18 PROCEDURE — 93000 ELECTROCARDIOGRAM COMPLETE: CPT | Mod: 59

## 2025-07-21 ENCOUNTER — NON-APPOINTMENT (OUTPATIENT)
Age: 45
End: 2025-07-21

## 2025-07-21 ENCOUNTER — APPOINTMENT (OUTPATIENT)
Dept: CARDIOLOGY | Facility: CLINIC | Age: 45
End: 2025-07-21

## 2025-07-22 ENCOUNTER — APPOINTMENT (OUTPATIENT)
Dept: CARDIOLOGY | Facility: CLINIC | Age: 45
End: 2025-07-22

## 2025-07-30 ENCOUNTER — APPOINTMENT (OUTPATIENT)
Dept: CARDIOLOGY | Facility: CLINIC | Age: 45
End: 2025-07-30